# Patient Record
Sex: MALE | Race: WHITE | NOT HISPANIC OR LATINO | Employment: PART TIME | URBAN - METROPOLITAN AREA
[De-identification: names, ages, dates, MRNs, and addresses within clinical notes are randomized per-mention and may not be internally consistent; named-entity substitution may affect disease eponyms.]

---

## 2024-03-01 DIAGNOSIS — M27.9 LESION OF MANDIBLE: Primary | ICD-10-CM

## 2024-03-12 ENCOUNTER — HOSPITAL ENCOUNTER (OUTPATIENT)
Dept: RADIOLOGY | Facility: CLINIC | Age: 59
Discharge: HOME | End: 2024-03-12
Payer: COMMERCIAL

## 2024-03-12 DIAGNOSIS — M27.9 LESION OF MANDIBLE: ICD-10-CM

## 2024-03-12 PROCEDURE — 76377 3D RENDER W/INTRP POSTPROCES: CPT

## 2024-03-12 PROCEDURE — 70486 CT MAXILLOFACIAL W/O DYE: CPT

## 2024-04-02 ENCOUNTER — ANESTHESIA EVENT (OUTPATIENT)
Dept: OPERATING ROOM | Facility: HOSPITAL | Age: 59
DRG: 497 | End: 2024-04-02
Payer: COMMERCIAL

## 2024-04-03 ENCOUNTER — APPOINTMENT (OUTPATIENT)
Dept: RADIOLOGY | Facility: HOSPITAL | Age: 59
DRG: 497 | End: 2024-04-03
Payer: COMMERCIAL

## 2024-04-03 ENCOUNTER — ANESTHESIA (OUTPATIENT)
Dept: OPERATING ROOM | Facility: HOSPITAL | Age: 59
DRG: 497 | End: 2024-04-03
Payer: COMMERCIAL

## 2024-04-03 ENCOUNTER — HOSPITAL ENCOUNTER (INPATIENT)
Facility: HOSPITAL | Age: 59
LOS: 1 days | Discharge: HOME | DRG: 497 | End: 2024-04-04
Attending: DENTIST
Payer: COMMERCIAL

## 2024-04-03 DIAGNOSIS — M26.602 LEFT TEMPOROMANDIBULAR JOINT DISORDER, UNSPECIFIED: Primary | ICD-10-CM

## 2024-04-03 LAB
ABO GROUP (TYPE) IN BLOOD: NORMAL
ALBUMIN SERPL BCP-MCNC: 4.3 G/DL (ref 3.4–5)
ANION GAP SERPL CALC-SCNC: 22 MMOL/L (ref 10–20)
ANTIBODY SCREEN: NORMAL
BUN SERPL-MCNC: 17 MG/DL (ref 6–23)
CALCIUM SERPL-MCNC: 9.3 MG/DL (ref 8.6–10.6)
CHLORIDE SERPL-SCNC: 98 MMOL/L (ref 98–107)
CO2 SERPL-SCNC: 24 MMOL/L (ref 21–32)
CREAT SERPL-MCNC: 1.06 MG/DL (ref 0.5–1.3)
EGFRCR SERPLBLD CKD-EPI 2021: 81 ML/MIN/1.73M*2
ERYTHROCYTE [DISTWIDTH] IN BLOOD BY AUTOMATED COUNT: 12.8 % (ref 11.5–14.5)
GLUCOSE SERPL-MCNC: 200 MG/DL (ref 74–99)
HCT VFR BLD AUTO: 47.3 % (ref 41–52)
HGB BLD-MCNC: 16.2 G/DL (ref 13.5–17.5)
MCH RBC QN AUTO: 29.7 PG (ref 26–34)
MCHC RBC AUTO-ENTMCNC: 34.2 G/DL (ref 32–36)
MCV RBC AUTO: 87 FL (ref 80–100)
NRBC BLD-RTO: 0 /100 WBCS (ref 0–0)
PHOSPHATE SERPL-MCNC: 5.4 MG/DL (ref 2.5–4.9)
PLATELET # BLD AUTO: 217 X10*3/UL (ref 150–450)
POTASSIUM SERPL-SCNC: 3.9 MMOL/L (ref 3.5–5.3)
RBC # BLD AUTO: 5.46 X10*6/UL (ref 4.5–5.9)
RH FACTOR (ANTIGEN D): NORMAL
SODIUM SERPL-SCNC: 140 MMOL/L (ref 136–145)
WBC # BLD AUTO: 14.8 X10*3/UL (ref 4.4–11.3)

## 2024-04-03 PROCEDURE — C1889 IMPLANT/INSERT DEVICE, NOC: HCPCS | Performed by: DENTIST

## 2024-04-03 PROCEDURE — 2500000004 HC RX 250 GENERAL PHARMACY W/ HCPCS (ALT 636 FOR OP/ED)

## 2024-04-03 PROCEDURE — 0RRD0JZ REPLACEMENT OF LEFT TEMPOROMANDIBULAR JOINT WITH SYNTHETIC SUBSTITUTE, OPEN APPROACH: ICD-10-PCS | Performed by: STUDENT IN AN ORGANIZED HEALTH CARE EDUCATION/TRAINING PROGRAM

## 2024-04-03 PROCEDURE — 2500000004 HC RX 250 GENERAL PHARMACY W/ HCPCS (ALT 636 FOR OP/ED): Performed by: DENTIST

## 2024-04-03 PROCEDURE — 1100000001 HC PRIVATE ROOM DAILY

## 2024-04-03 PROCEDURE — 2500000005 HC RX 250 GENERAL PHARMACY W/O HCPCS

## 2024-04-03 PROCEDURE — 70355 PANORAMIC X-RAY OF JAWS: CPT

## 2024-04-03 PROCEDURE — 3700000002 HC GENERAL ANESTHESIA TIME - EACH INCREMENTAL 1 MINUTE: Performed by: DENTIST

## 2024-04-03 PROCEDURE — A4217 STERILE WATER/SALINE, 500 ML: HCPCS | Performed by: DENTIST

## 2024-04-03 PROCEDURE — 2500000005 HC RX 250 GENERAL PHARMACY W/O HCPCS: Performed by: DENTIST

## 2024-04-03 PROCEDURE — 2720000007 HC OR 272 NO HCPCS: Performed by: DENTIST

## 2024-04-03 PROCEDURE — 0RPD04Z REMOVAL OF INTERNAL FIXATION DEVICE FROM LEFT TEMPOROMANDIBULAR JOINT, OPEN APPROACH: ICD-10-PCS | Performed by: STUDENT IN AN ORGANIZED HEALTH CARE EDUCATION/TRAINING PROGRAM

## 2024-04-03 PROCEDURE — 85027 COMPLETE CBC AUTOMATED: CPT

## 2024-04-03 PROCEDURE — C1713 ANCHOR/SCREW BN/BN,TIS/BN: HCPCS | Performed by: DENTIST

## 2024-04-03 PROCEDURE — A21243 PR ARTHROPLASTY TMJ+PROSTHESIS: Performed by: ANESTHESIOLOGY

## 2024-04-03 PROCEDURE — 88305 TISSUE EXAM BY PATHOLOGIST: CPT | Performed by: PATHOLOGY

## 2024-04-03 PROCEDURE — 80069 RENAL FUNCTION PANEL: CPT

## 2024-04-03 PROCEDURE — 2500000001 HC RX 250 WO HCPCS SELF ADMINISTERED DRUGS (ALT 637 FOR MEDICARE OP)

## 2024-04-03 PROCEDURE — 86901 BLOOD TYPING SEROLOGIC RH(D): CPT

## 2024-04-03 PROCEDURE — 7100000002 HC RECOVERY ROOM TIME - EACH INCREMENTAL 1 MINUTE: Performed by: DENTIST

## 2024-04-03 PROCEDURE — 3600000005 HC OR TIME - INITIAL BASE CHARGE - PROCEDURE LEVEL FIVE: Performed by: DENTIST

## 2024-04-03 PROCEDURE — 36415 COLL VENOUS BLD VENIPUNCTURE: CPT

## 2024-04-03 PROCEDURE — 7100000001 HC RECOVERY ROOM TIME - INITIAL BASE CHARGE: Performed by: DENTIST

## 2024-04-03 PROCEDURE — A4649 SURGICAL SUPPLIES: HCPCS | Performed by: DENTIST

## 2024-04-03 PROCEDURE — 3700000001 HC GENERAL ANESTHESIA TIME - INITIAL BASE CHARGE: Performed by: DENTIST

## 2024-04-03 PROCEDURE — C1729 CATH, DRAINAGE: HCPCS | Performed by: DENTIST

## 2024-04-03 PROCEDURE — 3600000010 HC OR TIME - EACH INCREMENTAL 1 MINUTE - PROCEDURE LEVEL FIVE: Performed by: DENTIST

## 2024-04-03 PROCEDURE — 70355 PANORAMIC X-RAY OF JAWS: CPT | Performed by: RADIOLOGY

## 2024-04-03 PROCEDURE — 86850 RBC ANTIBODY SCREEN: CPT

## 2024-04-03 PROCEDURE — 88305 TISSUE EXAM BY PATHOLOGIST: CPT | Mod: TC,SUR | Performed by: DENTIST

## 2024-04-03 PROCEDURE — 2780000003 HC OR 278 NO HCPCS: Performed by: DENTIST

## 2024-04-03 DEVICE — IMPLANTABLE DEVICE: Type: IMPLANTABLE DEVICE | Site: CHIN | Status: NON-FUNCTIONAL

## 2024-04-03 DEVICE — IMPLANTABLE DEVICE: Type: IMPLANTABLE DEVICE | Site: CHIN | Status: FUNCTIONAL

## 2024-04-03 RX ORDER — HYDROMORPHONE HYDROCHLORIDE 1 MG/ML
0.2 INJECTION, SOLUTION INTRAMUSCULAR; INTRAVENOUS; SUBCUTANEOUS EVERY 5 MIN PRN
Status: DISCONTINUED | OUTPATIENT
Start: 2024-04-03 | End: 2024-04-03 | Stop reason: HOSPADM

## 2024-04-03 RX ORDER — SUCCINYLCHOLINE CHLORIDE 20 MG/ML
INJECTION INTRAMUSCULAR; INTRAVENOUS AS NEEDED
Status: DISCONTINUED | OUTPATIENT
Start: 2024-04-03 | End: 2024-04-03

## 2024-04-03 RX ORDER — ALBUTEROL SULFATE 0.83 MG/ML
2.5 SOLUTION RESPIRATORY (INHALATION) ONCE AS NEEDED
Status: DISCONTINUED | OUTPATIENT
Start: 2024-04-03 | End: 2024-04-03 | Stop reason: HOSPADM

## 2024-04-03 RX ORDER — LIDOCAINE HYDROCHLORIDE 20 MG/ML
INJECTION, SOLUTION INFILTRATION; PERINEURAL AS NEEDED
Status: DISCONTINUED | OUTPATIENT
Start: 2024-04-03 | End: 2024-04-03

## 2024-04-03 RX ORDER — DEXAMETHASONE SODIUM PHOSPHATE 100 MG/10ML
8 INJECTION INTRAMUSCULAR; INTRAVENOUS EVERY 8 HOURS SCHEDULED
Status: DISCONTINUED | OUTPATIENT
Start: 2024-04-03 | End: 2024-04-04 | Stop reason: HOSPADM

## 2024-04-03 RX ORDER — PROPOFOL 10 MG/ML
INJECTION, EMULSION INTRAVENOUS CONTINUOUS PRN
Status: DISCONTINUED | OUTPATIENT
Start: 2024-04-03 | End: 2024-04-03

## 2024-04-03 RX ORDER — HYDRALAZINE HYDROCHLORIDE 20 MG/ML
5 INJECTION INTRAMUSCULAR; INTRAVENOUS EVERY 30 MIN PRN
Status: DISCONTINUED | OUTPATIENT
Start: 2024-04-03 | End: 2024-04-03 | Stop reason: HOSPADM

## 2024-04-03 RX ORDER — OXYMETAZOLINE HCL 0.05 %
SPRAY, NON-AEROSOL (ML) NASAL AS NEEDED
Status: DISCONTINUED | OUTPATIENT
Start: 2024-04-03 | End: 2024-04-03

## 2024-04-03 RX ORDER — HYDROMORPHONE HYDROCHLORIDE 1 MG/ML
INJECTION, SOLUTION INTRAMUSCULAR; INTRAVENOUS; SUBCUTANEOUS AS NEEDED
Status: DISCONTINUED | OUTPATIENT
Start: 2024-04-03 | End: 2024-04-03

## 2024-04-03 RX ORDER — GLYCOPYRROLATE 0.2 MG/ML
INJECTION INTRAMUSCULAR; INTRAVENOUS AS NEEDED
Status: DISCONTINUED | OUTPATIENT
Start: 2024-04-03 | End: 2024-04-03

## 2024-04-03 RX ORDER — IBUPROFEN 600 MG/1
600 TABLET ORAL 3 TIMES DAILY
Status: DISCONTINUED | OUTPATIENT
Start: 2024-04-03 | End: 2024-04-04 | Stop reason: HOSPADM

## 2024-04-03 RX ORDER — NALOXONE HYDROCHLORIDE 0.4 MG/ML
0.2 INJECTION, SOLUTION INTRAMUSCULAR; INTRAVENOUS; SUBCUTANEOUS EVERY 5 MIN PRN
Status: DISCONTINUED | OUTPATIENT
Start: 2024-04-03 | End: 2024-04-03

## 2024-04-03 RX ORDER — BACITRACIN 500 [USP'U]/G
OINTMENT TOPICAL 3 TIMES DAILY
Status: DISCONTINUED | OUTPATIENT
Start: 2024-04-03 | End: 2024-04-04 | Stop reason: HOSPADM

## 2024-04-03 RX ORDER — OXYCODONE HYDROCHLORIDE 5 MG/1
10 TABLET ORAL EVERY 4 HOURS PRN
Status: DISCONTINUED | OUTPATIENT
Start: 2024-04-03 | End: 2024-04-04 | Stop reason: HOSPADM

## 2024-04-03 RX ORDER — LOSARTAN POTASSIUM 50 MG/1
50 TABLET ORAL DAILY
COMMUNITY

## 2024-04-03 RX ORDER — ROCURONIUM BROMIDE 10 MG/ML
INJECTION, SOLUTION INTRAVENOUS AS NEEDED
Status: DISCONTINUED | OUTPATIENT
Start: 2024-04-03 | End: 2024-04-03

## 2024-04-03 RX ORDER — ENOXAPARIN SODIUM 100 MG/ML
40 INJECTION SUBCUTANEOUS EVERY 24 HOURS
Status: DISCONTINUED | OUTPATIENT
Start: 2024-04-04 | End: 2024-04-04 | Stop reason: HOSPADM

## 2024-04-03 RX ORDER — NALOXONE HYDROCHLORIDE 0.4 MG/ML
0.2 INJECTION, SOLUTION INTRAMUSCULAR; INTRAVENOUS; SUBCUTANEOUS EVERY 5 MIN PRN
Status: DISCONTINUED | OUTPATIENT
Start: 2024-04-03 | End: 2024-04-04 | Stop reason: HOSPADM

## 2024-04-03 RX ORDER — CEFAZOLIN 1 G/1
INJECTION, POWDER, FOR SOLUTION INTRAVENOUS AS NEEDED
Status: DISCONTINUED | OUTPATIENT
Start: 2024-04-03 | End: 2024-04-03

## 2024-04-03 RX ORDER — ENOXAPARIN SODIUM 100 MG/ML
40 INJECTION SUBCUTANEOUS ONCE
Status: DISCONTINUED | OUTPATIENT
Start: 2024-04-03 | End: 2024-04-03

## 2024-04-03 RX ORDER — LABETALOL HYDROCHLORIDE 5 MG/ML
INJECTION, SOLUTION INTRAVENOUS AS NEEDED
Status: DISCONTINUED | OUTPATIENT
Start: 2024-04-03 | End: 2024-04-03

## 2024-04-03 RX ORDER — CLONAZEPAM 1 MG/1
1 TABLET ORAL 2 TIMES DAILY PRN
COMMUNITY

## 2024-04-03 RX ORDER — ENOXAPARIN SODIUM 100 MG/ML
40 INJECTION SUBCUTANEOUS ONCE
Status: COMPLETED | OUTPATIENT
Start: 2024-04-03 | End: 2024-04-03

## 2024-04-03 RX ORDER — OXYCODONE HYDROCHLORIDE 5 MG/1
5 TABLET ORAL EVERY 6 HOURS PRN
Status: DISCONTINUED | OUTPATIENT
Start: 2024-04-03 | End: 2024-04-04 | Stop reason: HOSPADM

## 2024-04-03 RX ORDER — CHLORHEXIDINE GLUCONATE ORAL RINSE 1.2 MG/ML
15 SOLUTION DENTAL 2 TIMES DAILY
Status: DISCONTINUED | OUTPATIENT
Start: 2024-04-03 | End: 2024-04-04 | Stop reason: HOSPADM

## 2024-04-03 RX ORDER — SODIUM CHLORIDE, SODIUM LACTATE, POTASSIUM CHLORIDE, CALCIUM CHLORIDE 600; 310; 30; 20 MG/100ML; MG/100ML; MG/100ML; MG/100ML
125 INJECTION, SOLUTION INTRAVENOUS CONTINUOUS
Status: DISCONTINUED | OUTPATIENT
Start: 2024-04-03 | End: 2024-04-04

## 2024-04-03 RX ORDER — PHENYLEPHRINE 10 MG/250 ML(40 MCG/ML)IN 0.9 % SOD.CHLORIDE INTRAVENOUS
CONTINUOUS PRN
Status: DISCONTINUED | OUTPATIENT
Start: 2024-04-03 | End: 2024-04-03

## 2024-04-03 RX ORDER — FENTANYL CITRATE 50 UG/ML
INJECTION, SOLUTION INTRAMUSCULAR; INTRAVENOUS AS NEEDED
Status: DISCONTINUED | OUTPATIENT
Start: 2024-04-03 | End: 2024-04-03

## 2024-04-03 RX ORDER — HYDROMORPHONE HYDROCHLORIDE 1 MG/ML
0.5 INJECTION, SOLUTION INTRAMUSCULAR; INTRAVENOUS; SUBCUTANEOUS EVERY 5 MIN PRN
Status: DISCONTINUED | OUTPATIENT
Start: 2024-04-03 | End: 2024-04-03 | Stop reason: HOSPADM

## 2024-04-03 RX ORDER — SODIUM CHLORIDE, SODIUM LACTATE, POTASSIUM CHLORIDE, CALCIUM CHLORIDE 600; 310; 30; 20 MG/100ML; MG/100ML; MG/100ML; MG/100ML
100 INJECTION, SOLUTION INTRAVENOUS CONTINUOUS
Status: DISCONTINUED | OUTPATIENT
Start: 2024-04-03 | End: 2024-04-03 | Stop reason: HOSPADM

## 2024-04-03 RX ORDER — OXYCODONE HYDROCHLORIDE 5 MG/1
5 TABLET ORAL EVERY 4 HOURS PRN
Status: DISCONTINUED | OUTPATIENT
Start: 2024-04-03 | End: 2024-04-03 | Stop reason: HOSPADM

## 2024-04-03 RX ORDER — DEXTROAMPHETAMINE SACCHARATE, AMPHETAMINE ASPARTATE MONOHYDRATE, DEXTROAMPHETAMINE SULFATE AND AMPHETAMINE SULFATE 5; 5; 5; 5 MG/1; MG/1; MG/1; MG/1
20 CAPSULE, EXTENDED RELEASE ORAL EVERY MORNING
COMMUNITY

## 2024-04-03 RX ORDER — ACETAMINOPHEN 160 MG/5ML
650 SOLUTION ORAL EVERY 6 HOURS
Status: DISCONTINUED | OUTPATIENT
Start: 2024-04-03 | End: 2024-04-04 | Stop reason: HOSPADM

## 2024-04-03 RX ORDER — ONDANSETRON HYDROCHLORIDE 2 MG/ML
4 INJECTION, SOLUTION INTRAVENOUS ONCE AS NEEDED
Status: COMPLETED | OUTPATIENT
Start: 2024-04-03 | End: 2024-04-03

## 2024-04-03 RX ORDER — SODIUM CHLORIDE 0.9 G/100ML
IRRIGANT IRRIGATION AS NEEDED
Status: DISCONTINUED | OUTPATIENT
Start: 2024-04-03 | End: 2024-04-03 | Stop reason: HOSPADM

## 2024-04-03 RX ORDER — PHENYLEPHRINE HCL IN 0.9% NACL 0.4MG/10ML
SYRINGE (ML) INTRAVENOUS AS NEEDED
Status: DISCONTINUED | OUTPATIENT
Start: 2024-04-03 | End: 2024-04-03

## 2024-04-03 RX ORDER — ONDANSETRON 4 MG/1
4 TABLET, FILM COATED ORAL EVERY 8 HOURS PRN
Status: DISCONTINUED | OUTPATIENT
Start: 2024-04-03 | End: 2024-04-04 | Stop reason: HOSPADM

## 2024-04-03 RX ORDER — LABETALOL HYDROCHLORIDE 5 MG/ML
5 INJECTION, SOLUTION INTRAVENOUS ONCE AS NEEDED
Status: DISCONTINUED | OUTPATIENT
Start: 2024-04-03 | End: 2024-04-03 | Stop reason: HOSPADM

## 2024-04-03 RX ORDER — ACETAMINOPHEN 325 MG/1
650 TABLET ORAL EVERY 4 HOURS PRN
Status: DISCONTINUED | OUTPATIENT
Start: 2024-04-03 | End: 2024-04-03 | Stop reason: SDUPTHER

## 2024-04-03 RX ORDER — ACETAMINOPHEN 10 MG/ML
INJECTION, SOLUTION INTRAVENOUS AS NEEDED
Status: DISCONTINUED | OUTPATIENT
Start: 2024-04-03 | End: 2024-04-03

## 2024-04-03 RX ORDER — LOSARTAN POTASSIUM 50 MG/1
50 TABLET ORAL DAILY
Status: DISCONTINUED | OUTPATIENT
Start: 2024-04-03 | End: 2024-04-04 | Stop reason: HOSPADM

## 2024-04-03 RX ORDER — ONDANSETRON HYDROCHLORIDE 2 MG/ML
4 INJECTION, SOLUTION INTRAVENOUS EVERY 8 HOURS PRN
Status: DISCONTINUED | OUTPATIENT
Start: 2024-04-03 | End: 2024-04-04 | Stop reason: HOSPADM

## 2024-04-03 RX ORDER — METHOCARBAMOL 100 MG/ML
1000 INJECTION, SOLUTION INTRAMUSCULAR; INTRAVENOUS ONCE
Status: COMPLETED | OUTPATIENT
Start: 2024-04-03 | End: 2024-04-03

## 2024-04-03 RX ORDER — MIDAZOLAM HYDROCHLORIDE 1 MG/ML
INJECTION INTRAMUSCULAR; INTRAVENOUS AS NEEDED
Status: DISCONTINUED | OUTPATIENT
Start: 2024-04-03 | End: 2024-04-03

## 2024-04-03 RX ORDER — ACETAMINOPHEN 325 MG/1
650 TABLET ORAL EVERY 6 HOURS
Status: DISCONTINUED | OUTPATIENT
Start: 2024-04-03 | End: 2024-04-04 | Stop reason: HOSPADM

## 2024-04-03 RX ORDER — TRANEXAMIC ACID 10 MG/ML
INJECTION, SOLUTION INTRAVENOUS AS NEEDED
Status: DISCONTINUED | OUTPATIENT
Start: 2024-04-03 | End: 2024-04-03

## 2024-04-03 RX ORDER — LIDOCAINE HYDROCHLORIDE AND EPINEPHRINE 10; 10 MG/ML; UG/ML
INJECTION, SOLUTION INFILTRATION; PERINEURAL AS NEEDED
Status: DISCONTINUED | OUTPATIENT
Start: 2024-04-03 | End: 2024-04-03 | Stop reason: HOSPADM

## 2024-04-03 RX ORDER — TRIPROLIDINE/PSEUDOEPHEDRINE 2.5MG-60MG
600 TABLET ORAL 3 TIMES DAILY
Status: DISCONTINUED | OUTPATIENT
Start: 2024-04-03 | End: 2024-04-04 | Stop reason: HOSPADM

## 2024-04-03 RX ORDER — PROPOFOL 10 MG/ML
INJECTION, EMULSION INTRAVENOUS AS NEEDED
Status: DISCONTINUED | OUTPATIENT
Start: 2024-04-03 | End: 2024-04-03

## 2024-04-03 RX ADMIN — IBUPROFEN 600 MG: 600 TABLET, FILM COATED ORAL at 21:39

## 2024-04-03 RX ADMIN — PROPOFOL 20 MG: 10 INJECTION, EMULSION INTRAVENOUS at 18:21

## 2024-04-03 RX ADMIN — FENTANYL CITRATE 50 MCG: 50 INJECTION, SOLUTION INTRAMUSCULAR; INTRAVENOUS at 15:32

## 2024-04-03 RX ADMIN — HYDROMORPHONE HYDROCHLORIDE 0.5 MG: 1 INJECTION, SOLUTION INTRAMUSCULAR; INTRAVENOUS; SUBCUTANEOUS at 19:33

## 2024-04-03 RX ADMIN — CEFAZOLIN 3 G: 1 INJECTION, POWDER, FOR SOLUTION INTRAMUSCULAR; INTRAVENOUS at 14:33

## 2024-04-03 RX ADMIN — TRANEXAMIC ACID 1000 MG: 10 INJECTION, SOLUTION INTRAVENOUS at 14:43

## 2024-04-03 RX ADMIN — LABETALOL HYDROCHLORIDE 5 MG: 5 INJECTION, SOLUTION INTRAVENOUS at 18:43

## 2024-04-03 RX ADMIN — HYDROMORPHONE HYDROCHLORIDE 0.5 MG: 1 INJECTION, SOLUTION INTRAMUSCULAR; INTRAVENOUS; SUBCUTANEOUS at 19:28

## 2024-04-03 RX ADMIN — PROPOFOL 50 MG: 10 INJECTION, EMULSION INTRAVENOUS at 14:25

## 2024-04-03 RX ADMIN — MIDAZOLAM HYDROCHLORIDE 1 MG: 1 INJECTION, SOLUTION INTRAMUSCULAR; INTRAVENOUS at 14:13

## 2024-04-03 RX ADMIN — ROCURONIUM BROMIDE 20 MG: 10 INJECTION INTRAVENOUS at 15:28

## 2024-04-03 RX ADMIN — HYDROMORPHONE HYDROCHLORIDE 0.5 MG: 1 INJECTION, SOLUTION INTRAMUSCULAR; INTRAVENOUS; SUBCUTANEOUS at 19:22

## 2024-04-03 RX ADMIN — OXYMETAZOLINE HYDROCHLORIDE 2 SPRAY: 0.05 SPRAY NASAL at 13:55

## 2024-04-03 RX ADMIN — ENOXAPARIN SODIUM 40 MG: 100 INJECTION SUBCUTANEOUS at 21:40

## 2024-04-03 RX ADMIN — GLYCOPYRROLATE 0.2 MG: 0.2 INJECTION INTRAMUSCULAR; INTRAVENOUS at 14:53

## 2024-04-03 RX ADMIN — HYDROMORPHONE HYDROCHLORIDE 0.5 MG: 1 INJECTION, SOLUTION INTRAMUSCULAR; INTRAVENOUS; SUBCUTANEOUS at 18:51

## 2024-04-03 RX ADMIN — REMIFENTANIL HYDROCHLORIDE 40 MCG: 1 INJECTION, POWDER, LYOPHILIZED, FOR SOLUTION INTRAVENOUS at 14:35

## 2024-04-03 RX ADMIN — LIDOCAINE HYDROCHLORIDE 100 MG: 20 INJECTION, SOLUTION INFILTRATION; PERINEURAL at 14:22

## 2024-04-03 RX ADMIN — PROPOFOL 50 MCG/KG/MIN: 10 INJECTION, EMULSION INTRAVENOUS at 14:46

## 2024-04-03 RX ADMIN — REMIFENTANIL HYDROCHLORIDE 40 MCG: 1 INJECTION, POWDER, LYOPHILIZED, FOR SOLUTION INTRAVENOUS at 16:17

## 2024-04-03 RX ADMIN — METHOCARBAMOL 1000 MG: 1000 INJECTION, SOLUTION INTRAMUSCULAR; INTRAVENOUS at 19:00

## 2024-04-03 RX ADMIN — PROPOFOL 200 MG: 10 INJECTION, EMULSION INTRAVENOUS at 14:23

## 2024-04-03 RX ADMIN — Medication 0.2 MCG/KG/MIN: at 14:46

## 2024-04-03 RX ADMIN — ACETAMINOPHEN 1000 MG: 10 INJECTION, SOLUTION INTRAVENOUS at 15:21

## 2024-04-03 RX ADMIN — HYDROMORPHONE HYDROCHLORIDE 0.5 MG: 1 INJECTION, SOLUTION INTRAMUSCULAR; INTRAVENOUS; SUBCUTANEOUS at 19:06

## 2024-04-03 RX ADMIN — LABETALOL HYDROCHLORIDE 10 MG: 5 INJECTION, SOLUTION INTRAVENOUS at 18:57

## 2024-04-03 RX ADMIN — FENTANYL CITRATE 50 MCG: 50 INJECTION, SOLUTION INTRAMUSCULAR; INTRAVENOUS at 15:14

## 2024-04-03 RX ADMIN — TRANEXAMIC ACID 1000 MG: 10 INJECTION, SOLUTION INTRAVENOUS at 18:38

## 2024-04-03 RX ADMIN — ROCURONIUM BROMIDE 10 MG: 10 INJECTION INTRAVENOUS at 17:39

## 2024-04-03 RX ADMIN — CHLORHEXIDINE GLUCONATE 15 ML: 1.2 SOLUTION ORAL at 21:40

## 2024-04-03 RX ADMIN — ONDANSETRON 4 MG: 2 INJECTION, SOLUTION INTRAMUSCULAR; INTRAVENOUS at 18:32

## 2024-04-03 RX ADMIN — PROPOFOL 30 MG: 10 INJECTION, EMULSION INTRAVENOUS at 18:24

## 2024-04-03 RX ADMIN — SODIUM CHLORIDE, POTASSIUM CHLORIDE, SODIUM LACTATE AND CALCIUM CHLORIDE: 600; 310; 30; 20 INJECTION, SOLUTION INTRAVENOUS at 14:13

## 2024-04-03 RX ADMIN — HYDROMORPHONE HYDROCHLORIDE 0.2 MG: 1 INJECTION, SOLUTION INTRAMUSCULAR; INTRAVENOUS; SUBCUTANEOUS at 18:32

## 2024-04-03 RX ADMIN — PROPOFOL 20 MG: 10 INJECTION, EMULSION INTRAVENOUS at 18:26

## 2024-04-03 RX ADMIN — CEFAZOLIN 3 G: 1 INJECTION, POWDER, FOR SOLUTION INTRAMUSCULAR; INTRAVENOUS at 18:26

## 2024-04-03 RX ADMIN — HYDROMORPHONE HYDROCHLORIDE 0.3 MG: 1 INJECTION, SOLUTION INTRAMUSCULAR; INTRAVENOUS; SUBCUTANEOUS at 18:28

## 2024-04-03 RX ADMIN — ACETAMINOPHEN 650 MG: 650 SOLUTION ORAL at 21:39

## 2024-04-03 RX ADMIN — DEXAMETHASONE SODIUM PHOSPHATE 10 MG: 4 INJECTION INTRA-ARTICULAR; INTRALESIONAL; INTRAMUSCULAR; INTRAVENOUS; SOFT TISSUE at 14:46

## 2024-04-03 RX ADMIN — Medication 80 MCG: at 14:48

## 2024-04-03 RX ADMIN — ROCURONIUM BROMIDE 20 MG: 10 INJECTION INTRAVENOUS at 16:30

## 2024-04-03 RX ADMIN — HYDROMORPHONE HYDROCHLORIDE 0.5 MG: 1 INJECTION, SOLUTION INTRAMUSCULAR; INTRAVENOUS; SUBCUTANEOUS at 19:01

## 2024-04-03 RX ADMIN — PROPOFOL 30 MG: 10 INJECTION, EMULSION INTRAVENOUS at 16:16

## 2024-04-03 RX ADMIN — SUGAMMADEX 400 MG: 100 INJECTION, SOLUTION INTRAVENOUS at 18:32

## 2024-04-03 RX ADMIN — MIDAZOLAM HYDROCHLORIDE 1 MG: 1 INJECTION, SOLUTION INTRAMUSCULAR; INTRAVENOUS at 15:00

## 2024-04-03 RX ADMIN — REMIFENTANIL HYDROCHLORIDE 40 MCG: 1 INJECTION, POWDER, LYOPHILIZED, FOR SOLUTION INTRAVENOUS at 16:14

## 2024-04-03 RX ADMIN — DEXAMETHASONE SODIUM PHOSPHATE 8 MG: 10 INJECTION INTRAMUSCULAR; INTRAVENOUS at 23:46

## 2024-04-03 RX ADMIN — SUCCINYLCHOLINE CHLORIDE 200 MG: 20 INJECTION, SOLUTION INTRAMUSCULAR; INTRAVENOUS at 14:24

## 2024-04-03 RX ADMIN — HYDROMORPHONE HYDROCHLORIDE 0.5 MG: 1 INJECTION, SOLUTION INTRAMUSCULAR; INTRAVENOUS; SUBCUTANEOUS at 19:16

## 2024-04-03 RX ADMIN — SODIUM CHLORIDE, POTASSIUM CHLORIDE, SODIUM LACTATE AND CALCIUM CHLORIDE 125 ML/HR: 600; 310; 30; 20 INJECTION, SOLUTION INTRAVENOUS at 23:52

## 2024-04-03 RX ADMIN — REMIFENTANIL HYDROCHLORIDE 60 MCG: 1 INJECTION, POWDER, LYOPHILIZED, FOR SOLUTION INTRAVENOUS at 16:48

## 2024-04-03 RX ADMIN — Medication 120 MCG: at 17:20

## 2024-04-03 RX ADMIN — OXYCODONE HYDROCHLORIDE 10 MG: 5 TABLET ORAL at 23:45

## 2024-04-03 RX ADMIN — Medication 160 MCG: at 14:52

## 2024-04-03 ASSESSMENT — PAIN - FUNCTIONAL ASSESSMENT
PAIN_FUNCTIONAL_ASSESSMENT: 0-10
PAIN_FUNCTIONAL_ASSESSMENT: UNABLE TO SELF-REPORT
PAIN_FUNCTIONAL_ASSESSMENT: 0-10
PAIN_FUNCTIONAL_ASSESSMENT: UNABLE TO SELF-REPORT
PAIN_FUNCTIONAL_ASSESSMENT: 0-10
PAIN_FUNCTIONAL_ASSESSMENT: 0-10

## 2024-04-03 ASSESSMENT — PAIN SCALES - GENERAL
PAINLEVEL_OUTOF10: 9
PAINLEVEL_OUTOF10: 7
PAINLEVEL_OUTOF10: 8
PAINLEVEL_OUTOF10: 10 - WORST POSSIBLE PAIN
PAINLEVEL_OUTOF10: 10 - WORST POSSIBLE PAIN
PAINLEVEL_OUTOF10: 8
PAINLEVEL_OUTOF10: 10 - WORST POSSIBLE PAIN
PAINLEVEL_OUTOF10: 5 - MODERATE PAIN

## 2024-04-03 ASSESSMENT — COLUMBIA-SUICIDE SEVERITY RATING SCALE - C-SSRS
1. IN THE PAST MONTH, HAVE YOU WISHED YOU WERE DEAD OR WISHED YOU COULD GO TO SLEEP AND NOT WAKE UP?: NO
2. HAVE YOU ACTUALLY HAD ANY THOUGHTS OF KILLING YOURSELF?: NO
6. HAVE YOU EVER DONE ANYTHING, STARTED TO DO ANYTHING, OR PREPARED TO DO ANYTHING TO END YOUR LIFE?: NO

## 2024-04-03 ASSESSMENT — ENCOUNTER SYMPTOMS
CONSTITUTIONAL NEGATIVE: 1
ENDOCRINE NEGATIVE: 1
NEUROLOGICAL NEGATIVE: 1
RESPIRATORY NEGATIVE: 1
ALLERGIC/IMMUNOLOGIC NEGATIVE: 1
HEMATOLOGIC/LYMPHATIC NEGATIVE: 1
PSYCHIATRIC NEGATIVE: 1
EYES NEGATIVE: 1
MUSCULOSKELETAL NEGATIVE: 1
CARDIOVASCULAR NEGATIVE: 1
GASTROINTESTINAL NEGATIVE: 1

## 2024-04-03 ASSESSMENT — PAIN DESCRIPTION - DESCRIPTORS: DESCRIPTORS: SHARP

## 2024-04-03 NOTE — OP NOTE
Excision Adipose Tissue Graft Torso (L), Removal Hardware Face (L), Arthroplasty Temporomandibular Joint (L) Operative Note     Date: 4/3/2024  OR Location: [facility] [place] OR    Name: James Herr, : 1965, Age: 59 y.o., MRN: [id number], Sex: male    Diagnosis  Pre-op Diagnosis     * Left temporomandibular joint disorder, unspecified [M26.602] Post-op Diagnosis     * Left temporomandibular joint disorder, unspecified [M26.602]     Procedures  Excision Adipose Tissue Graft Torso  20345 - PA GRAFTING OF AUTOLOGOUS SOFT TISS BY DIRECT EXC    Removal Hardware Face  92075 - PA REMOVAL IMPLANT DEEP    Arthroplasty Temporomandibular Joint  82628 - PA ARTHRP TMPRMAND JOINT W/PROSTHETIC REPLACEMENT    Use of surgical splints and Guides     Surgeons      * Rudy Spence - Primary    Resident/Fellow/Other Assistant:  Surgeon(s) and Role:     * Abraham Ramos DDS - Resident - Assisting     * Azam Cao DMD - Resident - Assisting    Procedure Summary  Anesthesia: * No anesthesia type entered *  ASA: III  Anesthesia Staff: Anesthesiologist: Schuyler Medina MD; Ming Burris MD; Tyree Jacobs MD  C-AA: DARIUS Sheridan  Anesthesia Resident: Naseem Gage MD; Massimo Saul DO  Estimated Blood Loss: 150mL  Intra-op Medications:   Administrations occurring from 1300 to 1630 on 24:   Medication Name Total Dose   sodium chloride 0.9 % irrigation solution 1,000 mL   lidocaine-epinephrine (Xylocaine W/EPI) 1 %-1:100,000 injection 8 mL              Anesthesia Record               Intraprocedure I/O Totals          Intake    Remifentanil Drip 0.00 mL    The total shown is the total volume documented since Anesthesia Start was filed.    Tranexamic Acid 0.00 mL    The total shown is the total volume documented since Anesthesia Start was filed.    Propofol Drip 0.00 mL    The total shown is the total volume documented since Anesthesia Start was filed.    Phenylephrine Drip 0.00 mL    The total shown is the  total volume documented since Anesthesia Start was filed.    Total Intake 0 mL          Specimen:   ID Type Source Tests Collected by Time   1 : LEFT MANDIBULAR CONDYLE AND HARDWARE Tissue BONE RESECTION SURGICAL PATHOLOGY EXAM Rudy Spence, DDS 4/3/2024 4778        Staff:   Circulator: Mariam Wolf, JOSEY; Brandin Martinez, JOSEY; Heide Ramirez RN  Relief Circulator: Tamara Bose RN  Relief Scrub: Trinidad Humphrey; Ramiro Miranda  Scrub Person: Jenn Matias         Drains and/or Catheters: * None in log *    Tourniquet Times:         Implants:  Implants       Type Name Action Serial No.      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 male who is having surgery for TMJ Disorder Unspecified rt,lt,b/l [M26.60].     The patient was seen in the preoperative area. The risks, benefits, complications, treatment options, non-operative alternatives, expected recovery and outcomes were discussed with the patient. The possibilities of reaction to medication, pulmonary aspiration, injury to surrounding structures, bleeding, recurrent infection, the need for additional procedures, failure to diagnose a condition, and creating a complication requiring transfusion or operation were discussed with the patient. The patient concurred with the proposed plan, giving informed consent.  The site of surgery was properly noted/marked if necessary per policy. The patient has been actively warmed in preoperative area. Preoperative antibiotics  "have been ordered and given within 1 hours of incision. Venous thrombosis prophylaxis are not indicated.    Procedure Details:   The patient was greeted in the pre-op holding area, where all preoperative risks and complications were reviewed. Later, the patient was brought into the operating room by the anesthesia staff and was placed in the supine position. A \"Time out\" was performed to confirmed patient's identity and the procedure to be performed. The patient was then induced for general anesthesia and intubated with a nasoendotracheal tube. Following intubation, the nasoendotracheal tube was secured by the surgical team using 2-0 silk suture and a standard head wrap. Intraorally, a throat pack was placed, and IMF screws were placed into the maxilla (x4) and mandible (x4). The patient was prepped and draped in standard oral and maxillofacial surgery fashion. Oral cavity and nose were occluded with Ioban.    Attention was then turned to the Left retromandibular planned incision site. An incision was made using a 15 blade extending from the skin to the subcutaneous fat layer.  The platysma was incised and a subplatysmal flap was elevated using a combination of blunt and sharp dissection.  A checkpoint nerve stimulator was used to monitor marginal mandibular nerve after confirming with anesthesia that patient was not paralyzed. Marginal mandibular nerve was not visualized, and there was no indication that it was violated during testing with the nerve stimulator. Dissection proceeded to the inferior border of the mandible/angle of the mandible. Once the pterygoid masseteric sling of the mandible was visualized and the bony mandible was palpable, the pterygoid masseteric sling was incised using electrocautery. Subperiosteal dissection was carried out to expose the mandibular angle, posterior border, anterior border and superiorly to expose sigmoid notch region and failed hardware.     Pineapple smith was used to remove " bone incasing the mandibular hardware. Reconstruction plate was cut using fissue smith and was removed. Mandibular ramus was leveled off to prevent rocking of the prosthesis using a pineapple smith.     The left preauricular incision and retromandibular incision was then marked with marking pen. ~10cc 1% lidocaine with 1:100k epi was administered via subcutaneous infiltration at planned incision sites. Preauricular incision started first. A15 blade was used to incision through skin and subcutaneous tissue down to superficial layer of temporalis fascia. Once this layer reached, the zygomatic arch was palpated and incision was made through superficial temporalis fascia down to fat later and carried to the arch. Periosteal elevator used to maintain contact on bony arch while dissecting anteriorly along the arch to expose the fossa and capsular ligament. Dissection then proceeded inferiorly in the plane of the initial incision site to exposed condyle and fossa through the capsular ligament. Once adequate exposure was achieved, A sonopet saw was used to create osteotomy, wood handle osteotome used to finalize osteotomy,  condyle. The condyle was stripped of its attachments and removed from the joint.After the condylar head was removed the portion of the mandible with the failed hardware and fractured segment were removed. The glenoid fossa and articular eminence were then exposed and any excess tissue removed to fully expose the fossa. 360 apex sonopet tip used to level off the emenance to prevent any rocking of the fossa component.     One of the team members at this point went dirty/intraorally to place the patient in IMF with 25 guage wire in stable occlusion. The oral cavity was again occluded with Ioban. Gloves and gowns were changed.     At the left TMJ, the fossa component was seated, was in stable position with no rocking, and this was secured to the temporal bone with appropriate sized screws. The ramus  component was then seated through the retromandibular incision and positioned so the condylar component was in appropriate position in the fossa component. Once this position was achieved, the holes in ramus component prosthesis were drilled with copious irrigation and screw holes populated with appropriate sized screws.     One of the team members at this point went dirty and removed the IMF wires and assessed occlusion, which was stable and reproducible. The IMF screws were also removed. The oral cavity was again occluded with Ioban.         The left neck and preauricular incisions were copiously irrigated with irrricept. Fat was taken from the neck incision and was placed in the left between the fossa and the condylar component.  Floseal was placed into each incision. Closure completed in a layered fashion using 4-0 vicryl sutures to close the fascial layers, platysma, and then subcutaneous tissue. ¼” penrose drain was placed in subplatysmal layer to bilateral neck. Skin was closed with 5-0 resorbable sutures in running continuous fashion.     The IMF screws x8 were removed. Tooth eleator was used t oluxate tooth #14 and forceps used to extract tooth #14. 3-0 chromic gut suture was used  to re approximate soft tissue at extraction site. The throat pack was removed. The oral cavity was suctioned. The patient was carefully cleaned with a wet and dry sponge.  Bacitracin applied to neck wounds. A Jaw bra and fluffs dressing were placed.      Care of the patient was then turned over to the anesthesia team. The patient was emerged from anesthesia, extubated and was taken to PACU in stable condition.       Dr. Spence was present for all critical portions of the case.    Complications:  None; patient tolerated the procedure well.    Disposition: PACU - hemodynamically stable.  Condition: stable         Attending Attestation:     Rudy Spence  Phone Number: [phone]        Abraham Ramos MD, DDS  Oral and Maxillofacial  surgery   PGY-5

## 2024-04-03 NOTE — ANESTHESIA PROCEDURE NOTES
Peripheral IV  Date/Time: 4/3/2024 2:32 PM      Placement  Needle size: 20 G  Laterality: right  Location: hand  Site prep: chlorhexidine

## 2024-04-03 NOTE — ANESTHESIA PREPROCEDURE EVALUATION
Patient: James Herr    Procedure Information       Date/Time: 04/03/24 1300    Procedures:       Excision Adipose Tissue Graft Torso (Left)      Removal Hardware Face (Left)      Arthroplasty Temporomandibular Joint (Left)    Location: [facility] OR 06 / [facility] OR    Surgeons: Rudy Spence DDS            Relevant Problems   No relevant active problems       Clinical information reviewed:   Tobacco  Allergies  Meds   Med Hx  Surg Hx   Fam Hx  Soc Hx        NPO Detail:  NPO/Void Status  Carbohydrate Drink Given Prior to Surgery? : N  Date of Last Liquid: 04/03/24  Time of Last Liquid: 0600  Date of Last Solid: 04/02/24  Time of Last Solid: 2000  Last Intake Type: Clear fluids  Time of Last Void: 1255         Physical Exam    Airway  Mallampati: III  TM distance: >3 FB  Neck ROM: full     Cardiovascular   Rhythm: regular  Rate: normal     Dental    Pulmonary - normal exam     Abdominal            Anesthesia Plan    History of general anesthesia?: yes  History of complications of general anesthesia?: no    ASA 3     general     intravenous induction   Anesthetic plan and risks discussed with patient.    Plan discussed with resident.

## 2024-04-03 NOTE — H&P
History Of Present Illness  James Herr is a 59 y.o. male presenting with left mandibular hardware failure. He has a history of benign osteochondroma of the left TMJ and was treated with excision in 2005. He has had multiple steroid injections to the left joint and neck for pain in the left TMJ. Patient also presents with pain and recurrent caries on #14.     Past Medical History  RA, fibromyalgia, HTN, SUZY    Surgical History  He has no past surgical history on file.  Head and neck surgery    Social History  He has no history on file for tobacco use, alcohol use, and drug use.    Family History  No family history on file.     Allergies  Patient has no allergy information on record.    Review of Systems   Constitutional: Negative.    HENT: Negative.     Eyes: Negative.    Respiratory: Negative.     Cardiovascular: Negative.    Gastrointestinal: Negative.    Endocrine: Negative.    Genitourinary: Negative.    Musculoskeletal: Negative.    Skin: Negative.    Allergic/Immunologic: Negative.    Neurological: Negative.    Hematological: Negative.    Psychiatric/Behavioral: Negative.          Physical Exam  Constitutional:       Appearance: Normal appearance.   HENT:      Head: Normocephalic and atraumatic.      Comments: Left preauricular scar and submandibular scar   Left preauricular TTP  CN VII intact  Left CN V3 paresthesia       Nose: Nose normal.      Mouth/Throat:      Mouth: Mucous membranes are moist.      Comments: CHERELLE ~40 mm  Deviation to left on opening  Occlusion stable  Eyes:      Extraocular Movements: Extraocular movements intact.      Conjunctiva/sclera: Conjunctivae normal.      Pupils: Pupils are equal, round, and reactive to light.   Cardiovascular:      Rate and Rhythm: Normal rate.   Pulmonary:      Effort: Pulmonary effort is normal.   Abdominal:      Palpations: Abdomen is soft.   Musculoskeletal:         General: Normal range of motion.      Cervical back: Normal range of motion.    Skin:     General: Skin is warm.      Capillary Refill: Capillary refill takes less than 2 seconds.   Neurological:      General: No focal deficit present.      Mental Status: He is alert and oriented to person, place, and time. Mental status is at baseline.   Psychiatric:         Mood and Affect: Mood normal.         Behavior: Behavior normal.         Thought Content: Thought content normal.          Last Recorded Vitals  There were no vitals taken for this visit.    Relevant Results  CT 3D reconstruction    Result Date: 3/12/2024  Interpreted By:  Lakisha Feliz, STUDY: CT FACIAL BONES WO IV CONTRAST; CT 3D RECONSTRUCTION   INDICATION: Signs/Symptoms:pre surgical   History provided by clinician: Concern for left mandibular hardware failure.   COMPARISON: None   ACCESSION NUMBER(S): [id number]; [id number]   ORDERING CLINICIAN: DEEPTI ARELLANO   TECHNIQUE: Multidetector noncontrast CT examination of the face. Coronal and sagittal reformations in soft tissue and bone windows were obtained. 3D reformats were performed on a separate workstation and reviewed.   FINDINGS: Skin and subcutaneous soft tissues: No asymmetrical soft tissue swelling or hematoma.   Osseous structures: Plate and screw fixation across left mandibular ramus fracture, which demonstrates nonunion (series 8, image 24). Surgical hardware demonstrates focal discontinuity with approximate 4 mm overlap (series 5, image 168). Perihardware lucency along the inferior aspect of the surgical hardware measuring 2 mm (series 7, image 58). Severe degenerative changes of the left temporomandibular joint characterized by osteophyte formation and bone remodeling of the glenoid fossa and mandibular condyle. The mandibular condyle is anteriorly and inferiorly displaced from the glenoid fossa.   Remaining osseous structures are unremarkable.   Dentition: Normal.   Orbits: The globes, retrobulbar fat, extraocular muscles, and optic nerve sheath complexes are  intact and normal in morphology.   Paranasal sinuses: Mild to moderate polypoid mucosal thickening of the left maxillary sinus predominantly at the alveolar recess. Mild mucosal thickening of the anterior ethmoid air cells.   Mastoid air cells: Clear.   Other: Limited evaluation of the intracranial structures demonstrates no gross abnormality.       Status post plate and screw fixation of left mandibular ramus fracture, which demonstrates chronic nonunion. Discontinuous plate at the level of the fracture with perihardware lucency concerning for hardware failure.   Anterior/inferior subluxation with severe degenerative change of the left temporomandibular joint.   Mild-to-moderate polypoid mucosal thickening of the left maxillary sinus.   Signed by: Lakisha Feliz 3/12/2024 1:38 PM Dictation workstation:   [id number]    CT maxillofacial bones wo IV contrast    Result Date: 3/12/2024  Interpreted By:  Lakisha eFliz, STUDY: CT FACIAL BONES WO IV CONTRAST; CT 3D RECONSTRUCTION   INDICATION: Signs/Symptoms:pre surgical   History provided by clinician: Concern for left mandibular hardware failure.   COMPARISON: None   ACCESSION NUMBER(S): [id number]; [id number]   ORDERING CLINICIAN: DEEPTI ARELLANO   TECHNIQUE: Multidetector noncontrast CT examination of the face. Coronal and sagittal reformations in soft tissue and bone windows were obtained. 3D reformats were performed on a separate workstation and reviewed.   FINDINGS: Skin and subcutaneous soft tissues: No asymmetrical soft tissue swelling or hematoma.   Osseous structures: Plate and screw fixation across left mandibular ramus fracture, which demonstrates nonunion (series 8, image 24). Surgical hardware demonstrates focal discontinuity with approximate 4 mm overlap (series 5, image 168). Perihardware lucency along the inferior aspect of the surgical hardware measuring 2 mm (series 7, image 58). Severe degenerative changes of the left temporomandibular joint  characterized by osteophyte formation and bone remodeling of the glenoid fossa and mandibular condyle. The mandibular condyle is anteriorly and inferiorly displaced from the glenoid fossa.   Remaining osseous structures are unremarkable.   Dentition: Normal.   Orbits: The globes, retrobulbar fat, extraocular muscles, and optic nerve sheath complexes are intact and normal in morphology.   Paranasal sinuses: Mild to moderate polypoid mucosal thickening of the left maxillary sinus predominantly at the alveolar recess. Mild mucosal thickening of the anterior ethmoid air cells.   Mastoid air cells: Clear.   Other: Limited evaluation of the intracranial structures demonstrates no gross abnormality.       Status post plate and screw fixation of left mandibular ramus fracture, which demonstrates chronic nonunion. Discontinuous plate at the level of the fracture with perihardware lucency concerning for hardware failure.   Anterior/inferior subluxation with severe degenerative change of the left temporomandibular joint.   Mild-to-moderate polypoid mucosal thickening of the left maxillary sinus.   Signed by: Lakisha Feliz 3/12/2024 1:38 PM Dictation workstation:   [id number]     Assessment/Plan   Active Problems:  There are no active Hospital Problems.    Patient is a 58 yo M who presents with left mandibular hardware failure for left TMJ replacement and extraction of tooth #14 in OR.            Luke Love DMD  OM[facility] PGY-1

## 2024-04-03 NOTE — ANESTHESIA POSTPROCEDURE EVALUATION
Patient: James Herr    Procedure Summary       Date: 04/03/24 Room / Location: [facility] OR 06 / [facility]    Anesthesia Start: 1415 Anesthesia Stop: 1900    Procedures:       Excision Adipose Tissue Graft Torso (Left)      Removal Hardware Face (Left)      Arthroplasty Temporomandibular Joint (Left) Diagnosis:       Left temporomandibular joint disorder, unspecified      (TMJ Disorder Unspecified rt,lt,b/l [M26.60])    Surgeons: Rudy Spence DDS Responsible Provider: Schuyler Medina MD    Anesthesia Type: general ASA Status: 3            Anesthesia Type: general    Vitals Value Taken Time   /107 04/03/24 1855   Temp 37 04/03/24 1901   Pulse 77 04/03/24 1858   Resp 9 04/03/24 1858   SpO2 99 % 04/03/24 1858   Vitals shown include unvalidated device data.    Anesthesia Post Evaluation    Patient location during evaluation: PACU  Patient participation: complete - patient participated  Level of consciousness: sleepy but conscious  Pain management: inadequate  Multimodal analgesia pain management approach  Airway patency: patent  Cardiovascular status: hypertensive  Respiratory status: acceptable  Hydration status: acceptable  Postoperative Nausea and Vomiting: none        No notable events documented.

## 2024-04-03 NOTE — ANESTHESIA PROCEDURE NOTES
Airway  Date/Time: 4/3/2024 2:26 PM  Urgency: elective    Airway not difficult    Staffing  Performed: resident   Authorized by: Ming Burris MD    Performed by: Massimo Saul DO  Patient location during procedure: OR    Indications and Patient Condition  Indications for airway management: anesthesia  Spontaneous Ventilation: absent  Sedation level: deep  Preoxygenated: yes  Patient position: ramp  Mask difficulty assessment: 3 - difficult mask (inadequate, unstable or two providers) +/- NMBA  Planned trial extubation    Final Airway Details  Final airway type: endotracheal airway      Successful airway: ETT and CONNIE tube  Cuffed: yes   Successful intubation technique: video laryngoscopy  Facilitating devices/methods: intubating stylet  Endotracheal tube insertion site: right naris  Blade size: #4  ETT size (mm): 7.5  Cormack-Lehane Classification: grade I - full view of glottis  Placement verified by: chest auscultation and capnometry   Cuff volume (mL): 7  Measured from: nares  ETT to nares (cm): 28  Number of attempts at approach: 1    Additional Comments  Glidescope S4 blade used, Bilateral nares dilated with nasal trumpets (32, then 34 on the right; 32 on the left). 7.5 nasal connie ETT through right naris. No aspiration observed. Intubation atraumatic.

## 2024-04-04 VITALS
RESPIRATION RATE: 18 BRPM | WEIGHT: 315 LBS | OXYGEN SATURATION: 91 % | HEIGHT: 76 IN | DIASTOLIC BLOOD PRESSURE: 78 MMHG | SYSTOLIC BLOOD PRESSURE: 119 MMHG | BODY MASS INDEX: 38.36 KG/M2 | TEMPERATURE: 97.9 F | HEART RATE: 98 BPM

## 2024-04-04 PROCEDURE — 2500000004 HC RX 250 GENERAL PHARMACY W/ HCPCS (ALT 636 FOR OP/ED)

## 2024-04-04 PROCEDURE — 2500000001 HC RX 250 WO HCPCS SELF ADMINISTERED DRUGS (ALT 637 FOR MEDICARE OP)

## 2024-04-04 PROCEDURE — 2500000005 HC RX 250 GENERAL PHARMACY W/O HCPCS

## 2024-04-04 RX ORDER — BACITRACIN 500 [USP'U]/G
OINTMENT TOPICAL 3 TIMES DAILY
Qty: 28.4 G | Refills: 0 | Status: SHIPPED | OUTPATIENT
Start: 2024-04-04 | End: 2024-04-11

## 2024-04-04 RX ORDER — CEPHALEXIN 500 MG/1
500 CAPSULE ORAL 3 TIMES DAILY
Qty: 21 CAPSULE | Refills: 0 | Status: SHIPPED | OUTPATIENT
Start: 2024-04-04 | End: 2024-04-11

## 2024-04-04 RX ORDER — CHLORHEXIDINE GLUCONATE ORAL RINSE 1.2 MG/ML
15 SOLUTION DENTAL 2 TIMES DAILY
Qty: 473 ML | Refills: 0 | Status: SHIPPED | OUTPATIENT
Start: 2024-04-04 | End: 2024-04-11

## 2024-04-04 RX ORDER — AMOXICILLIN AND CLAVULANATE POTASSIUM 875; 125 MG/1; MG/1
875 TABLET, FILM COATED ORAL 2 TIMES DAILY
Qty: 14 TABLET | Refills: 0 | Status: SHIPPED | OUTPATIENT
Start: 2024-04-04 | End: 2024-04-04 | Stop reason: HOSPADM

## 2024-04-04 RX ORDER — CYCLOBENZAPRINE HCL 10 MG
10 TABLET ORAL
Status: DISCONTINUED | OUTPATIENT
Start: 2024-04-04 | End: 2024-04-04 | Stop reason: HOSPADM

## 2024-04-04 RX ORDER — TOBRAMYCIN 3 MG/ML
1 SOLUTION/ DROPS OPHTHALMIC
Qty: 40 ML | Refills: 0 | Status: SHIPPED | OUTPATIENT
Start: 2024-04-04

## 2024-04-04 RX ORDER — CYCLOBENZAPRINE HCL 10 MG
10 TABLET ORAL
Qty: 30 TABLET | Refills: 0 | Status: CANCELLED | OUTPATIENT
Start: 2024-04-04 | End: 2024-05-04

## 2024-04-04 RX ORDER — OXYCODONE HYDROCHLORIDE 5 MG/1
5 TABLET ORAL EVERY 6 HOURS PRN
Qty: 15 TABLET | Refills: 0 | Status: SHIPPED | OUTPATIENT
Start: 2024-04-04 | End: 2024-04-09

## 2024-04-04 RX ORDER — ACETAMINOPHEN 325 MG/1
650 TABLET ORAL EVERY 6 HOURS
Qty: 56 TABLET | Refills: 0 | Status: SHIPPED | OUTPATIENT
Start: 2024-04-04 | End: 2024-04-11

## 2024-04-04 RX ORDER — IBUPROFEN 600 MG/1
600 TABLET ORAL 3 TIMES DAILY
Qty: 21 TABLET | Refills: 0 | Status: SHIPPED | OUTPATIENT
Start: 2024-04-04 | End: 2024-04-11

## 2024-04-04 RX ORDER — CYCLOBENZAPRINE HCL 5 MG
5 TABLET ORAL 3 TIMES DAILY PRN
Qty: 45 TABLET | Refills: 0 | Status: SHIPPED | OUTPATIENT
Start: 2024-04-04 | End: 2024-05-04

## 2024-04-04 RX ORDER — CEPHALEXIN 500 MG/1
500 CAPSULE ORAL 3 TIMES DAILY
Qty: 21 CAPSULE | Refills: 0 | Status: SHIPPED | OUTPATIENT
Start: 2024-04-04 | End: 2024-04-04 | Stop reason: HOSPADM

## 2024-04-04 RX ORDER — ONDANSETRON 4 MG/1
4 TABLET, FILM COATED ORAL EVERY 8 HOURS PRN
Qty: 15 TABLET | Refills: 0 | Status: SHIPPED | OUTPATIENT
Start: 2024-04-04 | End: 2024-04-09

## 2024-04-04 RX ORDER — OXYCODONE HYDROCHLORIDE 5 MG/1
5 TABLET ORAL EVERY 6 HOURS PRN
Status: DISCONTINUED | OUTPATIENT
Start: 2024-04-04 | End: 2024-04-04 | Stop reason: SDUPTHER

## 2024-04-04 RX ADMIN — TOBRAMYCIN 0.5 INCH: 3 OINTMENT OPHTHALMIC at 01:54

## 2024-04-04 RX ADMIN — TOBRAMYCIN 0.5 INCH: 3 OINTMENT OPHTHALMIC at 05:54

## 2024-04-04 RX ADMIN — IBUPROFEN 600 MG: 600 TABLET, FILM COATED ORAL at 14:56

## 2024-04-04 RX ADMIN — TOBRAMYCIN 0.5 INCH: 3 OINTMENT OPHTHALMIC at 14:56

## 2024-04-04 RX ADMIN — DEXAMETHASONE SODIUM PHOSPHATE 8 MG: 10 INJECTION INTRAMUSCULAR; INTRAVENOUS at 14:56

## 2024-04-04 RX ADMIN — IBUPROFEN 600 MG: 600 TABLET, FILM COATED ORAL at 09:37

## 2024-04-04 RX ADMIN — TOBRAMYCIN 0.5 INCH: 3 OINTMENT OPHTHALMIC at 18:23

## 2024-04-04 RX ADMIN — OXYCODONE HYDROCHLORIDE 10 MG: 5 TABLET ORAL at 03:55

## 2024-04-04 RX ADMIN — CHLORHEXIDINE GLUCONATE 15 ML: 1.2 SOLUTION ORAL at 09:37

## 2024-04-04 RX ADMIN — ACETAMINOPHEN 650 MG: 650 SOLUTION ORAL at 09:37

## 2024-04-04 RX ADMIN — OXYCODONE HYDROCHLORIDE 10 MG: 5 TABLET ORAL at 09:37

## 2024-04-04 RX ADMIN — ACETAMINOPHEN 650 MG: 650 SOLUTION ORAL at 14:56

## 2024-04-04 RX ADMIN — CYCLOBENZAPRINE 10 MG: 10 TABLET, FILM COATED ORAL at 11:46

## 2024-04-04 RX ADMIN — TOBRAMYCIN 0.5 INCH: 3 OINTMENT OPHTHALMIC at 09:37

## 2024-04-04 RX ADMIN — DEXAMETHASONE SODIUM PHOSPHATE 8 MG: 10 INJECTION INTRAMUSCULAR; INTRAVENOUS at 05:54

## 2024-04-04 RX ADMIN — BACITRACIN: 500 OINTMENT TOPICAL at 14:56

## 2024-04-04 RX ADMIN — AMPICILLIN SODIUM AND SULBACTAM SODIUM 3 G: 2; 1 INJECTION, POWDER, FOR SOLUTION INTRAMUSCULAR; INTRAVENOUS at 11:48

## 2024-04-04 RX ADMIN — BACITRACIN: 500 OINTMENT TOPICAL at 09:37

## 2024-04-04 RX ADMIN — AMPICILLIN SODIUM AND SULBACTAM SODIUM 3 G: 2; 1 INJECTION, POWDER, FOR SOLUTION INTRAMUSCULAR; INTRAVENOUS at 05:45

## 2024-04-04 RX ADMIN — ACETAMINOPHEN 650 MG: 650 SOLUTION ORAL at 03:55

## 2024-04-04 ASSESSMENT — PAIN DESCRIPTION - LOCATION: LOCATION: EYE

## 2024-04-04 ASSESSMENT — COGNITIVE AND FUNCTIONAL STATUS - GENERAL
WALKING IN HOSPITAL ROOM: A LITTLE
DRESSING REGULAR LOWER BODY CLOTHING: A LITTLE
CLIMB 3 TO 5 STEPS WITH RAILING: A LITTLE
HELP NEEDED FOR BATHING: A LITTLE
MOBILITY SCORE: 22
DRESSING REGULAR UPPER BODY CLOTHING: A LITTLE
DAILY ACTIVITIY SCORE: 21

## 2024-04-04 ASSESSMENT — PAIN DESCRIPTION - ORIENTATION: ORIENTATION: LEFT

## 2024-04-04 ASSESSMENT — PAIN SCALES - GENERAL: PAINLEVEL_OUTOF10: 9

## 2024-04-04 ASSESSMENT — PAIN - FUNCTIONAL ASSESSMENT: PAIN_FUNCTIONAL_ASSESSMENT: 0-10

## 2024-04-04 NOTE — DISCHARGE SUMMARY
Discharge Diagnosis  Left temporomandibular joint disorder, unspecified    Issues Requiring Follow-Up  Left TMJ replacement and extraction of carious #14    Test Results Pending At Discharge  Pending Labs       Order Current Status    Surgical Pathology Exam Collected (04/03/24 3539)            Hospital Course  Patient did well overnight. Pain was well managed on Tylenol, Ibuprofen and Oxycodone for breakthrough pain. No acute events overnight. Patient started experiencing left sided eye irritation and was started on Tobramycin ophthalmic drops with improvement. Patient reports sleeping well and feels ready to go home. He is tolerating in PO, ambulating independently.     Pertinent Physical Exam At Time of Discharge  Physical Exam  Constitutional:       Appearance: Normal appearance. He is obese.   HENT:      Head: Normocephalic and atraumatic.      Comments: CN V3 left sided hypoesthesia, consistent with preop assessment. Mild weakness on left sided marginal mandibular and buccal branch of facial nerve.  Left sided temporal branch of FN moderately weak. Submandibular and preauricular incisions intact and soft on palpation. Penrose drain in place. Jobst dressing in place.      Right Ear: Ear canal and external ear normal.      Left Ear: Ear canal and external ear normal.      Ears:      Comments: Hearing acuity intact on finger rub test.      Nose: Nose normal.      Mouth/Throat:      Mouth: Mucous membranes are moist.      Comments: Extraction site #14 hemostatic   Occlusion stable and equal bilaterally  Eyes:      Extraocular Movements: Extraocular movements intact.      Pupils: Pupils are equal, round, and reactive to light.      Comments: Left conjunctiva slightly redenned with irritation   Cardiovascular:      Rate and Rhythm: Normal rate.      Pulses: Normal pulses.   Pulmonary:      Effort: Pulmonary effort is normal.   Abdominal:      Palpations: Abdomen is soft.   Musculoskeletal:         General: Normal  range of motion.      Cervical back: Normal range of motion.   Skin:     General: Skin is warm.      Capillary Refill: Capillary refill takes less than 2 seconds.   Neurological:      General: No focal deficit present.      Mental Status: He is alert and oriented to person, place, and time.   Psychiatric:         Mood and Affect: Mood normal.         Behavior: Behavior normal.         Home Medications     Medication List      ASK your doctor about these medications     amphetamine-dextroamphetamine XR 20 mg 24 hr capsule; Commonly known as:   Adderall XR   clonazePAM 1 mg tablet; Commonly known as: KlonoPIN   losartan 50 mg tablet; Commonly known as: Cozaar       Outpatient Follow-Up  No future appointments.  Follow up appointment: 1 week after surgery 4/12/24 at 11 AM.    The Oral [facility]illofacial Surgery clinic is located on the 1st floor within the [facility] ([address] [place] [address]). Our office phone number is [phone]. For any questions regarding patient care, please contact the resident on call at [phone]. Patients can contact the resident on call through [facility]  [phone]    Luke Love DMD

## 2024-04-04 NOTE — NURSING NOTE
7p - 7a: Post op  Patient arrived to unit on stretcher with belongings accompanied by transporter and his wife. Alert and oriented x4, s/p left TMJ arthroplasty and removal of existing hardware. Post op care provided to patient. Dr Love came to bedside post op. Patient did complained of left eye pain and burning with redness. Cold compress and and NS irrigation given. Provider was notified of patient's increased discomfort to left eye. New orders indicated. Patient is resting in bed.  He did lose his IV access to right hand. Provider was made aware. Will continue to monitor patient.

## 2024-04-04 NOTE — DISCHARGE INSTRUCTIONS
"MEDICATIONS  ° Pain medication should be taken only during the time that you feel significant discomfort. If the pain is severe, the prescription medication can be used. However, if only mild discomfort is experienced, try to use a less potent, over the counter medication such as Advil (ibuprofen and/or Tylenol (Acetaminophen). These can be taken in crushed tablets or in liquid form.  ° Antibiotics: This medicine should be taken at the appropriate interval as described on the bottle. Be sure not to miss any doses and take the medicine until it is gone.  ° Lip ointment:  Keep enough ointment, such as Vaseline, on the lips to keep them looking wet.    WOUND CARE  ° We recommend using ice packs on your face for the first 48 hours to reduce swelling and bruising over the affected areas. We recommend 20 minutes on and 20 minutes off. Make sure to wrap the ice pack in a towel - do not apply directly to skin.  Cold or heat directly on numb areas can lead to permanent damage of the skin.  ° Keep your head elevated, at least 30 degrees, to minimize swelling. This may require you to sleep in a reclining chair or using several pillows in bed.  ° Do not shower if you have a dressing on. Once the dressing is removed, keep the wound clean and dry for five days. On the 6th day, you may get it wet but no direct streams of water (for example, in the shower) and be careful to pat dry and not rub the wound.   ° If you have sutures, you may clean the sutures only with a cotton swab and a solution of ½ hydrogen peroxide and ½ water. We highly recommend this as the sutures dry out and become \"crusty\" with time.  ° Keep a thin coating of antibiotic ointment or Vaseline on the surgical sites (Polysporin, Bacitracin).     PHYSICAL ACTIVITY  ° Following surgery you will find that your energy level is much lower. This will take some time to return to normal.  ° Although you just had surgery, it is important that you do not stay in bed while " awake to minimize the chances of leg clots. We highly encourage occasional standing and walking as tolerated.  ° Try to transfer to a chair instead and walk throughout the house as much as you can tolerate. Try to be as active as possible. The swelling will worsen over the next 3-4 days after surgery and is expected. The swelling will begin to subside over the next few days.  ° Physical exercise such as walking or running can begin 2 or 3 weeks after surgery.  ° When you attempt to return to normal physical activity start slowly and work up to your normal level.  ° It is important that you take deep breaths after surgery to help prevent development of pneumonia. Try to take 10-20 deep breaths every hour while you are awake. Encourage yourself to gently cough if you feel mucous accumulating in the back of your throat or lungs.     DIET  ° Maintain a no chew diet until follow up.  ° Your diet will be slowly advanced accordingly during follow up.   ° Some sort of diet supplement such as Boost, Ensure, or similar substitutes may be used to increase calorie intake.    DRINKING  Keep hydrated by drinking at least 8-9 glasses of liquids a day. This is extremely important to prevent dehydration. Signs of dehydration are: dry mouth, dark yellow urine in small amounts, and dizziness with change in position or increased feeling of weakness/tiredness.  ° Do not use a straw for the first day or two since this can create more bleeding and may be difficult due to numbness.  ° Attempt to drink from a cup as soon as possible. While some fluid may spill when drinking, a cup is still the most effective way for taking fluids. The use of a ``sippy cup´´ like those used by toddlers may be helpful for the first few days.  ° Place a towel under your chin and pour a small amount of liquid into the cup. Tip your head back slightly and attempt to open your mouth a tiny bit while pouring the fluid into your mouth. Pour slowly and take in a  small amount of fluid. Take the cup away from your mouth. If necessary use slight finger pressure to place your lips together and attempt to swallow. This will be difficult at first, but you will find that it will become much easier in a day or two.  ° If drinking from a cup seems to be impossible, continue using the syringe with the tubing attached. Another alternative is to obtain a squeeze bottle to squirt fluid into your mouth.    JAW EXERCISES  ° You will be given appropriate advice regarding exercise of your jaw following your joint surgery.  ° During the first 24 hours after surgery no movement is permitted. Following that the exercises should be very gentle and slowly ``built up´´ over the course of 2-3 weeks.     CHANGES IN BITE  ° It is not uncommon for your bite you feel slightly different than prior to surgery. This will gradually settle over the coming weeks as the swelling in the joint reduces.     HEARING CHANGES  ° Patients may occasionally notice deafness or decreased hearing after surgery. This may occur due to swelling and accumulation of fluid and/or a blood clot in the ear canal.   ° This will subside over the next 2 weeks. But please notify your surgeon on follow up.     FACIAL MUSCLE WEAKNESS  ° The site of the surgery is adjacent to branches of the nerve that controls the muscles of your face. Due to this, nerves may be stretched due to the surgery or post-operative swelling, causing weakness of these muscles.   ° This will generally resolve over the course of weeks to months, and will be monitored during your follow up visits.     PLEASE REPORT ANY OF THE FOLLOWING TO OUR TEAM:  ° Sudden or excessive bleeding, swelling, or bruising.  ° Any itching, rash, or adverse reaction to medication.  ° Fever over 100 degrees Fahrenheit.  ° Drainage or discharge from the incision sites (other than blood).  ° Any injury to the face over the next 6 weeks.    If you have any questions or concerns please  contact us during regular office hours ([phone]).  For any emergency or after hours questions do not hesitate to contact the resident on call. You may call [phone] for the hospital  and ask for the ``Oral Surgeon On Call´´.

## 2024-04-04 NOTE — POST-PROCEDURE NOTE
Patient is a 58 yo male who is HD 1 and POD 0 s/p left TMJ arthroplasty, and removal of existing hardware.     Physical Exam  Constitutional:       Appearance: Normal appearance.   HENT:      Head: Normocephalic and atraumatic.      Comments CN V3 left sided hypoesthesia, consistent with preop assessment. Mild weakness on left sided marginal mandibular branch of facial nerve.  Left sided temporal branch of FN moderately weak. Submandibular and preauricular incisions intact and soft on palpation. Penrose drain in place. Jobst dressing in place.      Right Ear: Ear canal and external ear normal.      Left Ear: Ear canal and external ear normal.      Nose: Nose normal.      Mouth/Throat: Extraction site #14 hemostatic      Mouth: Mucous membranes are moist.      Comments: CHERELLE ~ 35 mm  Occlusion stable and equal bilaterally.   Eyes:      Extraocular Movements: Extraocular movements intact.      Conjunctiva/sclera: Conjunctivae normal.      Pupils: Pupils are equal, round, and reactive to light.   Cardiovascular:      Rate and Rhythm: Normal rate and regular rhythm.      Pulses: Normal pulses.   Pulmonary:      Effort: Pulmonary effort is normal.      Breath sounds: Normal breath sounds.   Abdominal:      Palpations: Abdomen is soft.      Comments: Nontender   Musculoskeletal:         General: Normal range of motion.      Cervical back: Normal range of motion and neck supple.   Skin:     General: Skin is warm.      Capillary Refill: Capillary refill takes less than 2 seconds.   Neurological:      General: No focal deficit present.      Mental Status: He is alert and oriented to person, place, and time. Mental status is at baseline.   Psychiatric:         Mood and Affect: Mood normal.         Behavior: Behavior normal.         Thought Content: Thought content normal.     Plan:  - Full liquid no reds diet, N/V prophylaxis, Ondansetron 4mg IV q6h PRN nausea & vomiting,  60 ml syringe with red rubber catheter attached for PO  fluids as needed    - Scheduled Acetaminophen 650mg q6h, Moderate Pain: Oxycodone 5mg q4h, Severe Pain: Oxycodone 10mg q4h    - PO liquids encouraged as tolerated. LR at 125 mL / hr    - Yankaur suction bedside. Dressing changes as needed,  Jaw bra for first 24 hours for comfort, may be removed, Peridex 0.12% (Chlorhexidine) TID rinse and spit after meals, 1/2 Strength H202 available bedside to remove scabs from external wounds, obtain postoperative XR.    - Supplemental O2, OOBTC, ambulate as tolerated. Humidified face mask prn nasal dryness.     - IV Unasyn     - 8mg Decadron q8h x3 doses    - SCDs , ambulate as tolerated, enoxaparin    - Monitor submandibular penrose drain drainage    Luke Love DMD  [facility] PGY-1

## 2024-04-16 LAB
LABORATORY COMMENT REPORT: NORMAL
PATH REPORT.FINAL DX SPEC: NORMAL
PATH REPORT.GROSS SPEC: NORMAL
PATH REPORT.RELEVANT HX SPEC: NORMAL
PATH REPORT.TOTAL CANCER: NORMAL

## (undated) DEVICE — DRESSING, TRANSPARENT, TEGADERM, 8 X 12 IN

## (undated) DEVICE — SUTURE, VICRYL, 3-0,18 IN, SH, UNDYED

## (undated) DEVICE — WOUND SYSTEM, DEBRIDEMENT & CLEANING, O.R DUOPAK

## (undated) DEVICE — Device

## (undated) DEVICE — CONTAINER, SPECIMEN, 120 ML, STERILE

## (undated) DEVICE — GLOVE, SURGICAL, PROTEXIS PI ORTHO, 7.5, PF, LF

## (undated) DEVICE — DRAPE, INCISE, ANTIMICROBIAL, IOBAN 2, LARGE, 17 X 23 IN, DISPOSABLE, STERILE

## (undated) DEVICE — DRESSING, GAUZE, DRAIN SPONGE, 6 PLY, EXCILON, 4 X 4 IN, STERILE

## (undated) DEVICE — COVER, TABLE, 44 X 75 IN, DISPOSABLE, LF, STERILE

## (undated) DEVICE — CATHETER, URETHRAL, PEZZER, 3 CM HEAD, 36 FR, LATEX

## (undated) DEVICE — DRAPE, TIBURON W/ADHESIVE, 19 X 30

## (undated) DEVICE — SUTURE, POLYSORB, 4-0, 18 IN, P12, UNDYED

## (undated) DEVICE — TUBING, SUCTION, CONNECTING, STERILE 0.25 X 120 IN., LF

## (undated) DEVICE — BOWL, BASIN, 32 OZ, STERILE

## (undated) DEVICE — PAD, GROUNDING, ELECTROSURGICAL, W/9 FT CABLE, POLYHESIVE II, ADULT, LF

## (undated) DEVICE — SUTURE, PROLENE, 5-0, 18 IN, P3, BLUE

## (undated) DEVICE — DRESSING, GAUZE, PETROLATUM, PATCH, XEROFORM, 1 X 8 IN, STERILE

## (undated) DEVICE — BLADE, DERMATOME, 1.25 X 4.25 IN, STERILE

## (undated) DEVICE — DEPRESSOR, TONGUE, 6 IN, WOOD, STERILE, LF

## (undated) DEVICE — REST, HEAD, BAGEL, 9 IN

## (undated) DEVICE — SEALER, BIPOLAR, AQUA MANTYS MIS FLEX MINI

## (undated) DEVICE — DRAIN, PENROSE, XRAY, 1/4 X 18 IN, LF

## (undated) DEVICE — BLADE, RECIPROCATING TAPERED

## (undated) DEVICE — GOWN, ASTOUND, XL

## (undated) DEVICE — DRAPE, TOWEL, STERI DRAPE, 17 X 11 IN, PLASTIC, STERILE

## (undated) DEVICE — BUR, SOLID OVAL, CARBIDE, MEDIUM, 4MM

## (undated) DEVICE — DRAPE, SHEET, FAN FOLDED, HALF, 44 X 58 IN, DISPOSABLE, LF, STERILE

## (undated) DEVICE — DRESSING, TRANSPARENT, TEGADERM, 4 X 4-3/4 IN

## (undated) DEVICE — BLADE, OPHTHALMIC, BEAVER, MINI, SHARP ONE SIDE

## (undated) DEVICE — SUTURE, VICRYL, 4-0, 18 IN, UNDYED BR PS-2

## (undated) DEVICE — DRESSING, NON-ADHERENT, TELFA, OUCHLESS, 3 X 8 IN, STERILE

## (undated) DEVICE — SPONGE, DISSECTOR, PEANUT, 3/8, STERILE 5 FOAM HOLDER"

## (undated) DEVICE — BANDAGE, GAUZE, CONFORMING, KERLIX, 6 PLY, 4.5 IN X 4.1 YD

## (undated) DEVICE — TUBE, SALEM SUMP, 16 FR X 48IN, ENFIT

## (undated) DEVICE — SUTURE, SILK, 2-0, 18 IN, BLACK

## (undated) DEVICE — COVER, CART, 45 X 27 X 48 IN, CLEAR

## (undated) DEVICE — PREP TRAY, SKIN

## (undated) DEVICE — CATHETER TRAY, SURESTEP, 16FR, URINE METER W/STATLOCK

## (undated) DEVICE — SEALANT, HEMOSTATIC, FLOSEAL, 10 ML

## (undated) DEVICE — CASSETTE, SONOPET IQ IRRIGATION SUCTION

## (undated) DEVICE — MANIFOLD, 4 PORT NEPTUNE STANDARD

## (undated) DEVICE — KNIFE, SONOPET IQ 12CM APEX 360

## (undated) DEVICE — DRIVER, HI-TORQUE, Z-DRIVER

## (undated) DEVICE — TIP, SUCTION, FRAZIER, W/CONTROL VENT, 10 FR

## (undated) DEVICE — APPLICATOR, CHLORAPREP, W/ORANGE TINT, 26ML

## (undated) DEVICE — SUCTION, VERSALIGHT MINI W/EXTENDED FRAZIER TIP

## (undated) DEVICE — SUTURE, SILK, 2-0, 30 IN, SH, BLACK

## (undated) DEVICE — SUPPORT, FACIOPLASTY, CROWN/CHIIN, LARGE, OVER 27 IN, ELASTIC

## (undated) DEVICE — DRESSING, SPONGE, GAUZE, CURITY, 4 X 4 IN, STERILE